# Patient Record
Sex: FEMALE | Race: WHITE | URBAN - METROPOLITAN AREA
[De-identification: names, ages, dates, MRNs, and addresses within clinical notes are randomized per-mention and may not be internally consistent; named-entity substitution may affect disease eponyms.]

---

## 2022-07-09 ENCOUNTER — TELEPHONE ENCOUNTER (OUTPATIENT)
Dept: URBAN - METROPOLITAN AREA CLINIC 121 | Facility: CLINIC | Age: 82
End: 2022-07-09

## 2022-07-10 ENCOUNTER — TELEPHONE ENCOUNTER (OUTPATIENT)
Dept: URBAN - METROPOLITAN AREA CLINIC 121 | Facility: CLINIC | Age: 82
End: 2022-07-10

## 2023-04-04 ENCOUNTER — OFFICE VISIT (OUTPATIENT)
Dept: URBAN - METROPOLITAN AREA CLINIC 63 | Facility: CLINIC | Age: 83
End: 2023-04-04
Payer: MEDICARE

## 2023-04-04 ENCOUNTER — WEB ENCOUNTER (OUTPATIENT)
Dept: URBAN - METROPOLITAN AREA CLINIC 63 | Facility: CLINIC | Age: 83
End: 2023-04-04

## 2023-04-04 ENCOUNTER — LAB OUTSIDE AN ENCOUNTER (OUTPATIENT)
Dept: URBAN - METROPOLITAN AREA CLINIC 63 | Facility: CLINIC | Age: 83
End: 2023-04-04

## 2023-04-04 VITALS
HEIGHT: 63 IN | OXYGEN SATURATION: 98 % | WEIGHT: 148 LBS | HEART RATE: 70 BPM | SYSTOLIC BLOOD PRESSURE: 138 MMHG | DIASTOLIC BLOOD PRESSURE: 58 MMHG | BODY MASS INDEX: 26.22 KG/M2 | TEMPERATURE: 96.9 F

## 2023-04-04 DIAGNOSIS — R10.11 RUQ PAIN: ICD-10-CM

## 2023-04-04 DIAGNOSIS — D50.9 IRON DEFICIENCY ANEMIA, UNSPECIFIED IRON DEFICIENCY ANEMIA TYPE: ICD-10-CM

## 2023-04-04 DIAGNOSIS — R11.0 NAUSEA: ICD-10-CM

## 2023-04-04 DIAGNOSIS — R10.13 EPIGASTRIC PAIN: ICD-10-CM

## 2023-04-04 PROBLEM — 87522002: Status: ACTIVE | Noted: 2023-04-04

## 2023-04-04 PROCEDURE — 99205 OFFICE O/P NEW HI 60 MIN: CPT | Performed by: NURSE PRACTITIONER

## 2023-04-04 RX ORDER — FLUOROMETHOLONE 1 MG/ML
INSTILL 1 DROP INTO EACH EYE TWICE DAILY FOR 14 DAYS SOLUTION/ DROPS OPHTHALMIC
Qty: 5 MILLILITER | Refills: 1 | Status: DISCONTINUED | COMMUNITY

## 2023-04-04 RX ORDER — ATORVASTATIN CALCIUM 20 MG/1
TAKE 1 TABLET BY MOUTH ONCE DAILY TABLET, FILM COATED ORAL
Qty: 90 EACH | Refills: 2 | Status: ACTIVE | COMMUNITY

## 2023-04-04 RX ORDER — LISINOPRIL 20 MG/1
TAKE 1 TABLET BY MOUTH ONCE DAILY TABLET ORAL
Qty: 90 EACH | Refills: 0 | Status: ACTIVE | COMMUNITY

## 2023-04-04 RX ORDER — FERROUS SULFATE 325(65) MG
1 TABLET TABLET ORAL ONCE A DAY
Status: ACTIVE | COMMUNITY

## 2023-04-04 RX ORDER — CIPROFLOXACIN 250 MG/1
TAKE 1 TABLET BY MOUTH TWICE DAILY FOR 7 DAYS TABLET, FILM COATED ORAL
Qty: 14 EACH | Refills: 0 | Status: DISCONTINUED | COMMUNITY

## 2023-04-04 RX ORDER — ALPRAZOLAM 1 MG/1
TAKE 1/2 (ONE-HALF) TABLET BY MOUTH TWICE DAILY AS NEEDED TABLET ORAL
Qty: 90 EACH | Refills: 0 | Status: ACTIVE | COMMUNITY

## 2023-04-04 RX ORDER — SPIRONOLACTONE 50 MG/1
TAKE 1 TABLET BY MOUTH TWICE DAILY TABLET, FILM COATED ORAL
Qty: 180 EACH | Refills: 0 | Status: ACTIVE | COMMUNITY

## 2023-04-04 RX ORDER — FAMOTIDINE 10 MG/1
1 TABLET AS NEEDED TABLET, FILM COATED ORAL TWICE A DAY
Status: ACTIVE | COMMUNITY

## 2023-04-04 RX ORDER — ATENOLOL 100 MG/1
TAKE 1 TABLET BY MOUTH ONCE DAILY TABLET ORAL
Qty: 90 EACH | Refills: 0 | Status: ACTIVE | COMMUNITY

## 2023-04-04 RX ORDER — SUCRALFATE 1 G/1
1 TABLET ON AN EMPTY STOMACH, DISSOLVE IN 2-3 OZ OF WATER BEFORE SWALLOWING TABLET ORAL
Qty: 120 | Refills: 1 | OUTPATIENT
Start: 2023-04-04 | End: 2023-06-03

## 2023-04-04 RX ORDER — FLUOXETINE 10 MG/1
TAKE 1 CAPSULE BY MOUTH ONCE DAILY CAPSULE ORAL
Qty: 90 EACH | Refills: 1 | Status: ACTIVE | COMMUNITY

## 2023-04-04 RX ORDER — OMEPRAZOLE 40 MG/1
1 CAPSULE 30 MINUTES BEFORE MORNING MEAL CAPSULE, DELAYED RELEASE ORAL ONCE A DAY
Qty: 90 CAPSULE | Refills: 3 | OUTPATIENT
Start: 2023-04-04

## 2023-04-04 RX ORDER — CLINDAMYCIN HYDROCHLORIDE 300 MG/1
TAKE 1 CAPSULE BY MOUTH THREE TIMES DAILY CAPSULE ORAL
Qty: 21 EACH | Refills: 0 | Status: DISCONTINUED | COMMUNITY

## 2023-04-04 RX ORDER — FUROSEMIDE 20 MG/1
TAKE 1 TABLET BY MOUTH ONCE DAILY TABLET ORAL
Qty: 90 EACH | Refills: 0 | Status: ACTIVE | COMMUNITY

## 2023-04-04 RX ORDER — AMOXICILLIN 500 MG/1
TAKE 1 CAPSULE BY MOUTH TWICE DAILY FOR 5 DAYS CAPSULE ORAL
Qty: 10 EACH | Refills: 0 | Status: DISCONTINUED | COMMUNITY

## 2023-04-04 RX ORDER — CYCLOBENZAPRINE HYDROCHLORIDE 5 MG/1
TAKE 1 TABLET BY MOUTH THREE TIMES DAILY AS NEEDED TABLET, FILM COATED ORAL
Qty: 270 EACH | Refills: 0 | Status: DISCONTINUED | COMMUNITY

## 2023-04-04 RX ORDER — POTASSIUM CHLORIDE 1500 MG/1
TAKE 1/2 (ONE-HALF) TABLET BY MOUTH ONCE DAILY TABLET, EXTENDED RELEASE ORAL
Qty: 90 EACH | Refills: 0 | Status: ACTIVE | COMMUNITY

## 2023-04-04 NOTE — HPI-TODAY'S VISIT:
Thank you very much for kindly referring Sintia Johnson, a very pleasant 83-year-old female, to our service due to gallstones, bleeding ulcer and weight loss.  Past medical history is significant for CHF, hypertension, hyperlipidemia, GERD, PUD and cholelithiasis.  Past surgical history is significant for hip surgery x2.  She relates her last complete EGD and colonoscopy was in December 2022 and it was secondary to iron deficiency anemia.  During that procedure, they report findings significant for gastric ulcers, which were cauterized, and she was placed on oral iron.  She is currently using Pepcid AC, twice daily with minimal efficacy.  Sintia presents today with her  and complains of ongoing postprandial upper abdominal pain, nausea and decreased appetite with a 20 pound weight loss in the past 3 months.  She states that food does not taste good anymore and relates excessive mouth watering when she is nauseous, typically after food hits her stomach, but denies any vomiting or dry heaves.  She does admit to still enjoying ice cream but has not increased her caloric intake with enjoyable foods.  She is otherwise asymptomatic from a GI perspective and relates 1 unremarkable bowel movement per day or every other day of dark brown stool, since starting oral iron.  She denies pyrosis, dysphagia, blood per rectum or melena.  Unfortunately, her medical records from [place] are not available for review at time of this office visit.

## 2023-04-08 LAB
A/G RATIO: 1.4
ALBUMIN: 3.9
ALKALINE PHOSPHATASE: 124
ALT (SGPT): 16
AST (SGOT): 18
BASO (ABSOLUTE): 0
BASOS: 1
BILIRUBIN, TOTAL: 0.3
BUN/CREATININE RATIO: 26
BUN: 43
CALCIUM: 9.4
CARBON DIOXIDE, TOTAL: 22
CHLORIDE: 102
CREATININE: 1.65
EGFR: 31
EOS (ABSOLUTE): 0.2
EOS: 3
FERRITIN, SERUM: 114
FOLATE (FOLIC ACID), SERUM: 14.9
GLOBULIN, TOTAL: 2.7
GLUCOSE: 84
HEMATOCRIT: 30.4
HEMATOLOGY COMMENTS:: (no result)
HEMOGLOBIN: 10.4
IMMATURE CELLS: (no result)
IMMATURE GRANS (ABS): 0
IMMATURE GRANULOCYTES: 0
IRON BIND.CAP.(TIBC): 244
IRON SATURATION: 25
IRON: 61
LYMPHS (ABSOLUTE): 1.8
LYMPHS: 23
MCH: 32.2
MCHC: 34.2
MCV: 94
MONOCYTES(ABSOLUTE): 0.8
MONOCYTES: 10
NEUTROPHILS (ABSOLUTE): 4.8
NEUTROPHILS: 63
NRBC: (no result)
PLATELETS: 393
POTASSIUM: 5.2
PROTEIN, TOTAL: 6.6
RBC: 3.23
RDW: 12.4
SODIUM: 136
UIBC: 183
VITAMIN B12: 771
WBC: 7.7

## 2023-04-09 ENCOUNTER — DASHBOARD ENCOUNTERS (OUTPATIENT)
Age: 83
End: 2023-04-09

## 2023-04-10 ENCOUNTER — TELEPHONE ENCOUNTER (OUTPATIENT)
Dept: URBAN - METROPOLITAN AREA CLINIC 63 | Facility: CLINIC | Age: 83
End: 2023-04-10

## 2023-04-26 ENCOUNTER — TELEPHONE ENCOUNTER (OUTPATIENT)
Dept: URBAN - METROPOLITAN AREA CLINIC 63 | Facility: CLINIC | Age: 83
End: 2023-04-26